# Patient Record
Sex: FEMALE | Race: BLACK OR AFRICAN AMERICAN | NOT HISPANIC OR LATINO | URBAN - NONMETROPOLITAN AREA
[De-identification: names, ages, dates, MRNs, and addresses within clinical notes are randomized per-mention and may not be internally consistent; named-entity substitution may affect disease eponyms.]

---

## 2017-05-31 PROBLEM — H52.13: Noted: 2017-05-31

## 2019-02-06 ENCOUNTER — IMPORTED ENCOUNTER (OUTPATIENT)
Dept: URBAN - NONMETROPOLITAN AREA CLINIC 1 | Facility: CLINIC | Age: 34
End: 2019-02-06

## 2019-02-06 PROCEDURE — 92015 DETERMINE REFRACTIVE STATE: CPT

## 2019-02-06 PROCEDURE — 92310 CONTACT LENS FITTING OU: CPT

## 2019-02-06 PROCEDURE — 92014 COMPRE OPH EXAM EST PT 1/>: CPT

## 2019-02-06 NOTE — PATIENT DISCUSSION
*[facility] RX:.-	  A new spectacle prescription was created and dispensed today. *Contact lens habits:.-	  Healthy contact lens wearing habits were discussed including nightly removal and cleaning of contacts and adherence to the recommended replacement schedule for disposable lenses. -	  Symptoms of infection were reviewed and instructions were given to discontinue contact lens use and call for an urgent appointment in the event of an eye infection. *Cl fit:.-	  Contact lenses fit well appropriately moving and re-centering with each blink.

## 2021-09-16 ENCOUNTER — IMPORTED ENCOUNTER (OUTPATIENT)
Dept: URBAN - NONMETROPOLITAN AREA CLINIC 1 | Facility: CLINIC | Age: 36
End: 2021-09-16

## 2021-09-16 PROCEDURE — 92014 COMPRE OPH EXAM EST PT 1/>: CPT

## 2021-09-16 PROCEDURE — V2520 CONTACT LENS HYDROPHILIC: HCPCS

## 2021-09-16 PROCEDURE — 92015 DETERMINE REFRACTIVE STATE: CPT

## 2021-09-16 PROCEDURE — 92310 CONTACT LENS FITTING OU: CPT

## 2022-04-15 ASSESSMENT — VISUAL ACUITY
OS_SC: J1
OS_CC: J1+
OD_SC: 20/25-
OD_SC: J1+
OS_SC: J1+
OD_SC: 20/25
OS_CC: J1
OS_SC: 20/25
OD_CC: J1
OS_SC: 20/20
OD_SC: J1
OD_CC: J1+

## 2022-04-15 ASSESSMENT — TONOMETRY
OS_IOP_MMHG: 11
OD_IOP_MMHG: 11
OD_IOP_MMHG: 12
OS_IOP_MMHG: 12

## 2024-01-17 ENCOUNTER — COMPREHENSIVE EXAM (OUTPATIENT)
Dept: RURAL CLINIC 1 | Facility: CLINIC | Age: 39
End: 2024-01-17

## 2024-01-17 DIAGNOSIS — H52.13: ICD-10-CM

## 2024-01-17 PROCEDURE — 92015 DETERMINE REFRACTIVE STATE: CPT

## 2024-01-17 PROCEDURE — 92014 COMPRE OPH EXAM EST PT 1/>: CPT

## 2024-01-17 PROCEDURE — 92310-E CONTACT LENS FITTING ESTABLISH PATIENT

## 2024-01-17 ASSESSMENT — VISUAL ACUITY
OD_CC: 20/25+1
OS_CC: 20/20

## 2024-01-17 ASSESSMENT — TONOMETRY
OD_IOP_MMHG: 12
OS_IOP_MMHG: 12

## 2025-01-21 ENCOUNTER — COMPREHENSIVE EXAM (OUTPATIENT)
Age: 40
End: 2025-01-21

## 2025-01-21 DIAGNOSIS — H52.13: ICD-10-CM

## 2025-01-21 PROCEDURE — 92014 COMPRE OPH EXAM EST PT 1/>: CPT

## 2025-01-21 PROCEDURE — 92310-1 LEVEL 1 SOFT LENS UPDATE

## 2025-01-21 PROCEDURE — 92015 DETERMINE REFRACTIVE STATE: CPT
